# Patient Record
Sex: FEMALE | Race: ASIAN | Employment: UNEMPLOYED
[De-identification: names, ages, dates, MRNs, and addresses within clinical notes are randomized per-mention and may not be internally consistent; named-entity substitution may affect disease eponyms.]

---

## 2024-11-19 ENCOUNTER — HOSPITAL ENCOUNTER (OUTPATIENT)
Facility: HOSPITAL | Age: 74
Setting detail: RECURRING SERIES
Discharge: HOME | End: 2024-11-22
Payer: COMMERCIAL

## 2024-11-19 PROCEDURE — 97162 PT EVAL MOD COMPLEX 30 MIN: CPT

## 2024-11-19 NOTE — THERAPY EVALUATION
Flexion 4+ 4+     Extension 5 5   Ankle Plantarflexion 5 5     Dorsiflexion 5 5      Flexibility: [] Unable to assess at this time  Decreased hamstring and gastroc flexibility bilaterally      Palpation:  Increased TTP to bilateral adductor musculature, medial>lateral joint line, proximal quad, medial>lateral gastroc     LEFS score = 11 (an established functional score where 80 = no disability).    Pt instructed in HEP and will f/u in clinic for PT.     Evaluation Complexity:  History:  HIGH Complexity :3+ comorbidities / personal factors will impact the outcome/ POC ; Examination:  MEDIUM Complexity : 3 Standardized tests and measures addressin body structure, function, activity limitation and / or participation in recreation  ;Presentation:  MEDIUM Complexity : Evolving with changing characteristics  ;Clinical Decision Making: Lower Extremity Functional Scale (LEFS) = 11 ; (< 40 Moderate to Severe Dysfunction) = HIGH Complexity  Overall Complexity Rating: MEDIUM  Problem List: pain affecting function, decrease ROM, decrease strength, impaired gait/balance, decrease ADL/functional abilities, decrease activity tolerance, decrease flexibility/joint mobility, and decrease transfer abilities    Treatment Plan may include any combination of the followin Therapeutic Exercise, 63161 Neuromuscular Re-Education, 30972 Manual Therapy, 65292 Therapeutic Activity, 05942 Self Care/Home Management, 84109 Electrical Stim unattended /  (MCR), 54633 Electrical Stim attended, 28278 Gait Training, 34860 Ultrasound, and (Elective Self Pay) Needle Insertion w/o Injection (1 or 2 muscles), (3+ muscles)  Patient / Family readiness to learn indicated by: asking questions, trying to perform skills, and return demonstration   Persons(s) to be included in education: patient (P) and family support person (FSP); list son  Barriers to Learning/Limitations: language  Measures taken if barriers to learning present: use of

## 2024-11-19 NOTE — PROGRESS NOTES
PT DAILY TREATMENT NOTE/KNEE EVAL       Patient Name: Roberta Comer    Date: 2024    : 1950  Insurance: Payor: [employer] / Plan: [facility] / Product Type: *No Product type* /      Patient  verified yes     Visit #   Current / Total 1 10   Time   In / Out 230 315   Pain   In / Out 10 10   Subjective Functional Status/Changes: See eval     Treatment Area: Left knee pain [M25.562]  Right knee pain [M25.561]    SUBJECTIVE  Pain Level (0-10 scale): worst 10/10, best 6/10, currently 10/10  []constant []intermittent []improving []worsening []no change since onset  Any medication changes, allergies to medications, adverse drug reactions, diagnosis change, or new procedure performed?: [x] No    [] Yes (see summary sheet for update)    Subjective functional status/changes:     , gradually worsening over the years. Now hard to walk. Inside of both knees. Hurts even when sleeping. Thighs and knees hurt. Very stiff particularly in the morning. Especially the knee joint. When walking feels not strong, knees buckle.   When she tries to lift foot, feels unsteady or weak and elizabeth.   Reports falls, broke shoulder and finger, tripped over the mat.   Aggravating: walking, prolonged standing, prolonged sitting  No stairs, first floor only.   Alleviating: when using hot compress or ice pack, sometimes pain relieving spray.  1 year this severe.   Denies JED.   Used to walk a lot  Diabetes increase in A1C level causes increase in pain.   Had ortho opinions in [place]  Hesitant to do surgery.   PLOF: 3-4x walking for 10 minutes  Imaging: Had x-ray and MRI, per son, cartilage damage  Current symptoms/Complaints: \"not excruciating pain always\" but does more, stand longer, travels   Previous Treatment/Compliance: --  PMHx/Surgical Hx: diabetes, shoulder/hand breaks  Living Situation: --    Roberta Comer is a 74 y.o.  yo female who presents to PT for bilateral knee pain, reporting insidious onset